# Patient Record
Sex: FEMALE | Race: WHITE | NOT HISPANIC OR LATINO | Employment: UNEMPLOYED | URBAN - METROPOLITAN AREA
[De-identification: names, ages, dates, MRNs, and addresses within clinical notes are randomized per-mention and may not be internally consistent; named-entity substitution may affect disease eponyms.]

---

## 2020-03-16 LAB
INFLUENZA A ANTIGEN, POC: NEGATIVE
INFLUENZA B ANTIGEN, POC: NEGATIVE

## 2022-04-10 ENCOUNTER — HISTORICAL (OUTPATIENT)
Dept: ADMINISTRATIVE | Facility: HOSPITAL | Age: 37
End: 2022-04-10

## 2022-04-26 VITALS
OXYGEN SATURATION: 100 % | HEIGHT: 67 IN | BODY MASS INDEX: 23.36 KG/M2 | DIASTOLIC BLOOD PRESSURE: 91 MMHG | SYSTOLIC BLOOD PRESSURE: 134 MMHG | WEIGHT: 148.81 LBS

## 2022-09-21 ENCOUNTER — HISTORICAL (OUTPATIENT)
Dept: ADMINISTRATIVE | Facility: HOSPITAL | Age: 37
End: 2022-09-21

## 2023-01-26 DIAGNOSIS — Z91.89 AT HIGH RISK FOR BREAST CANCER: Primary | ICD-10-CM

## 2023-02-03 ENCOUNTER — TELEPHONE (OUTPATIENT)
Dept: SURGERY | Facility: CLINIC | Age: 38
End: 2023-02-03
Payer: COMMERCIAL

## 2023-02-03 NOTE — TELEPHONE ENCOUNTER
Tried to contact patient in regards to gathering information on genetic history, but call was unsuccessful. Left voicemail to give office a call back. tcc

## 2023-02-06 ENCOUNTER — TELEPHONE (OUTPATIENT)
Dept: SURGERY | Facility: CLINIC | Age: 38
End: 2023-02-06
Payer: COMMERCIAL

## 2023-02-06 NOTE — TELEPHONE ENCOUNTER
Called and spoke w/ patient in regards to gathering more information on genetic testing history. Stated that she has not done genetic testing and have spoken w/ mother on subject 2years prior but was unable to gather more information with mother regarding genetic testing. Asked about imaging history; patient stated that she has tried to get mammo done (@BCA 2yrs prior) but was unable to get done, due to cost. Patient has High Risk appt on 2/7/23 @9AM. All questions have been answered. Advised patient to arrive 15min prior to appt time to check in. Patient verbalized understanding and confirmed appt. tcc

## 2023-02-07 ENCOUNTER — OFFICE VISIT (OUTPATIENT)
Dept: SURGERY | Facility: CLINIC | Age: 38
End: 2023-02-07
Payer: COMMERCIAL

## 2023-02-07 VITALS
HEIGHT: 66 IN | DIASTOLIC BLOOD PRESSURE: 70 MMHG | WEIGHT: 152.81 LBS | HEART RATE: 63 BPM | SYSTOLIC BLOOD PRESSURE: 106 MMHG | TEMPERATURE: 99 F | OXYGEN SATURATION: 100 % | RESPIRATION RATE: 18 BRPM | BODY MASS INDEX: 24.56 KG/M2

## 2023-02-07 DIAGNOSIS — Z80.3 FAMILY HISTORY OF BREAST CANCER: ICD-10-CM

## 2023-02-07 DIAGNOSIS — Z91.89 AT HIGH RISK FOR BREAST CANCER: Primary | ICD-10-CM

## 2023-02-07 PROCEDURE — 99999 PR PBB SHADOW E&M-EST. PATIENT-LVL IV: ICD-10-PCS | Mod: PBBFAC,,,

## 2023-02-07 PROCEDURE — 99999 PR PBB SHADOW E&M-EST. PATIENT-LVL IV: CPT | Mod: PBBFAC,,,

## 2023-02-07 PROCEDURE — 3008F PR BODY MASS INDEX (BMI) DOCUMENTED: ICD-10-PCS | Mod: CPTII,S$GLB,,

## 2023-02-07 PROCEDURE — 3078F DIAST BP <80 MM HG: CPT | Mod: CPTII,S$GLB,,

## 2023-02-07 PROCEDURE — 3008F BODY MASS INDEX DOCD: CPT | Mod: CPTII,S$GLB,,

## 2023-02-07 PROCEDURE — 3078F PR MOST RECENT DIASTOLIC BLOOD PRESSURE < 80 MM HG: ICD-10-PCS | Mod: CPTII,S$GLB,,

## 2023-02-07 PROCEDURE — 3074F PR MOST RECENT SYSTOLIC BLOOD PRESSURE < 130 MM HG: ICD-10-PCS | Mod: CPTII,S$GLB,,

## 2023-02-07 PROCEDURE — 1159F MED LIST DOCD IN RCRD: CPT | Mod: CPTII,S$GLB,,

## 2023-02-07 PROCEDURE — 99205 OFFICE O/P NEW HI 60 MIN: CPT | Mod: S$GLB,,,

## 2023-02-07 PROCEDURE — 1159F PR MEDICATION LIST DOCUMENTED IN MEDICAL RECORD: ICD-10-PCS | Mod: CPTII,S$GLB,,

## 2023-02-07 PROCEDURE — 3074F SYST BP LT 130 MM HG: CPT | Mod: CPTII,S$GLB,,

## 2023-02-07 PROCEDURE — 99205 PR OFFICE/OUTPT VISIT, NEW, LEVL V, 60-74 MIN: ICD-10-PCS | Mod: S$GLB,,,

## 2023-02-07 RX ORDER — TRANEXAMIC ACID 650 MG/1
TABLET ORAL
COMMUNITY
Start: 2023-02-01

## 2023-02-07 NOTE — PROGRESS NOTES
[facility] Breast [facility]  Breast Surgical Oncology  New Patient Office Visit - H&P      Referring Provider:  Dr. Kennedy Dixon - GYN      Chief Complaint:   Chief Complaint   Patient presents with    Genetic Evaluation     New patient presented for Genetic Visit. Family history of BC (mother); no genetic testing done; no breast pain, redness or nipple d/c to report.      Subjective:      History of Present Illness:  Charles Grover  is a pleasant female patient who initially presents on  02/07/2023 at 37 y.o. years old for evaluation and assessment of risk for breast cancer based on  the Tyrer - Cuzick Breast Cancer Risk Model (> 20% indicates elevated lifetime risk). Her lifetime risk is calculated to be 24%. The mastery of breast surgery program was down today in clinic, but I was able to use an outside calculater that used the same Tyrer Cuzick Score.     She currently denies any breast issues including rashes, redness, pain, swelling, nipple discharge, or new lumps/masses.    Imaging:   none    Pathology:   N/a     OB / GYN History   Menarche Onset:12   Menopause: Menopause: premenopausal  Hormonal birth control (duration): 6 years  Pregnancies: 3  Age at first child birth: 28  Child births: 2  Hysterectomy/Oophorectomy: no  HRT: no    Family History of Cancer (& age at diagnosis):  -   Family History   Problem Relation Age of Onset    Breast cancer Mother         late 50s    No Known Problems Father     Breast cancer Maternal Grandmother         early 50s    Other Maternal Grandfather         Blood disorder    Endocrine tumor Maternal Uncle     Pancreatic cancer Maternal Uncle       Maternal uncle endocrine tumor  and pancreatic cancer around 40 dianne     Lifestyle:  Height and Weight:   BMI: Body mass index is 24.66 kg/m².     Other:  # of breast biopsies (when and pathology results): 0  MG breast density: No breast composition recorded.   Prior thoracic RT: none  Genetic testing:  no  Ashkenazi Advent descent: No    Other History:     No past medical history on file.     Past Surgical History:   Procedure Laterality Date    ADENOIDECTOMY      TONSILLECTOMY          Social History     Socioeconomic History    Marital status:    Tobacco Use    Smoking status: Never    Smokeless tobacco: Never   Substance and Sexual Activity    Alcohol use: Not Currently    Drug use: Never    Sexual activity: Yes     Partners: Male          There is no immunization history on file for this patient.     Medications/Allergies:       Current Outpatient Medications:     tranexamic acid (LYSTEDA) 650 mg tablet, Take by mouth. Around menstrual cycles, Disp: , Rfl:     Review of patient's allergies indicates:  No Known Allergies     Review of Systems:      Constitutional: denies fevers, chills, weight loss  HEENT: denies blurry/double vision, changes in hearing, odynophagia, dysphagia  Respiratory: denies cough, shortness of breath  Cardiovascular: denies palpitations, swelling of the extremities  GI: denies abdominal pain, nausea/vomiting, hematochezia, frequent stools  : denies frequency, dysuria, flank pain, hematuria  Skin: denies new rashes  Neurological: denies muscular/sensory deficiencies, loss of coordination, headaches, memory changes  Endo: denies hair loss/thinning, nervousness, hot flashes, heat/cold intolerance, lumps in the neck area  Heme: denies easy bruising and fatigue  Psychological: denies anxious/depressive moods  Musculoskeletal: denies bony pain, muscle cramps, swollen joints       Objective/Physical Exam     Vitals:    02/07/23 0903   BP: 106/70   Pulse: 63   Resp: 18   Temp: 98.8 °F (37.1 °C)        General: The patient is awake, alert and oriented times three. The patient is well nourished and in no acute distress.  Neck: There is no evidence of palpable cervical, supraclavicular or axillary adenopathy. The neck is supple. The thyroid is not enlarged.  Musculoskeletal: The patient  has a normal range of motion of her bilateral upper extremities.  Chest: Examination of the chest wall fails to reveal any obvious abnormalities. Nonlabored breathing, symmetric expansion.  Breast:  Right: Examination of right breast fails to reveal any dominant masses or areas of significant focal nodularity. The nipple is everted without evidence of discharge. There is no skin dimpling with movement of the pectoralis. There are no significant skin changes overlying the breast.   Left: Examination of the left breast fails to reveal any dominant masses or areas of significant focal nodularity. The nipple is everted without evidence of discharge. There is no skin dimpling with movement of the pectoralis. There are no significant skin changes overlying the breast.  Abdomen: The abdomen is soft, flat, nontender and nondistended.  Integumentary: no rashes or skin lesions present  Neurologic: cranial nerves intact, no signs of peripheral neurological deficit, motor/sensory function intact     Assessment and Plan     There is no problem list on file for this patient.      Charles was seen today for genetic evaluation.    Diagnoses and all orders for this visit:    At high risk for breast cancer  -     MRI Breast w/wo Contrast, w/CAD, Bilateral; Future  -     Mammo Digital Screening Bilat w/ [facility]; Future    Family history of breast cancer  -     MRI Breast w/wo Contrast, w/CAD, Bilateral; Future  -     Mammo Digital Screening Bilat w/ [facility]; Future           --------------------------------------------------------------------------------------------------------------  After the initial clinical evaluation nearly 30 minutes were on counseling the patients regarding the options for management. Risk reduction strategies were discussed.     1. Lifestyle factors: As with other types of cancer, studies continue to show that various lifestyle factors may contribute to the development of breast cancer.     Weight: Recent studies have  "shown that postmenopausal women who are overweight or obese have an increased risk of breast cancer. These women also have a higher risk of having the cancer come back after treatment.     Physical activity: Decreased physical activity is associated with an increased risk of developing breast cancer and a higher risk of having the cancer come back after treatment. Regular physical activity may protect against breast cancer by helping women maintain a healthy body weight, lowering hormone levels, or causing changes in a womens metabolism or immune factors.     Alcohol: Current research suggests that having more than 1 to 2 alcoholic drinks, including beer, wine, and spirits, per day raises the risk of breast cancer, as well as the risk of having the cancer come back after treatment.     Food: There is no reliable research that confirms that eating or avoiding specific foods reduces the risk of developing breast cancer or having the cancer come back after treatment. However, eating more fruits and vegetables and fewer animal fats is linked with many health benefits.     2. Prevention:  Surgery to lower cancer risk: For women with BRCA1 or BRCA2 genetic mutations, which substantially increase the risk of breast cancer, preventive removal of the breasts may be considered. The procedure, called a prophylactic mastectomy, appears to reduce the risk of developing breast cancer by at least 95%. Women with these mutations should also consider the preventive removal of the ovaries and fallopian tubes, called a prophylactic salpingo-oophorectomy. This procedure can reduce the risk of developing ovarian cancer, as well as breast cancer, by stopping the ovaries from making estrogen.      Drugs to lower cancer risk (Chemoprevention): Women who have a higher than usual risk of developing breast cancer may consider certain drugs that may help prevent breast cancer. This approach may also be called "chemoprevention." For breast " cancer, this is the use of hormone-blocking drugs to reduce cancer risk. The drugs, tamoxifen (Soltamox) and raloxifene (Evista), are approved by the U.S. Food and Drug Administration (FDA) to lower breast cancer risk. These drugs are called selective estrogen receptor modulators (SERMs) and are not chemotherapy. A SERM is a medication that blocks estrogen receptors in some tissues and not others. Both women who have gone through menopause and those who have not may take tamoxifen. Raloxifene is only approved for women who have gone through menopause. Each drug also has different side effects.     Aromatase inhibitors (AIs) have also been shown to lower breast cancer risk. AIs are a type of hormone-blocking treatment that reduces the amount of estrogen in a woman's body by stopping tissues and organs other than the ovaries from producing estrogen. They can only be used by women who have gone through menopause. However, no AIs have been approved by the FDA for lowering breast cancer risk in women who do not have the disease.     3. Surveillance: Women at greater than 20 percent average lifetime risk of invasive breast cancer based mainly on family history     Clinical Breast exam: Every 6-12 months starting at age found to be at increased risk by risk model     Mammogram: Every year starting 10 years younger than the youngest breast cancer case in the family (but not before age 30)     Breast MRI: Every year starting 10 years younger than the youngest breast cancer case in the family (but not before age 25). If you have a first-degree relative with a BRCA1/2 gene mutation, youre encouraged to get genetic counseling and/or testing before getting MRI as part of screening (for those who do not wish to have genetic testing, MRI is recommended). Breast MRI in combination with mammography is better than mammography alone at finding breast cancer in certain women at higher than average  risk.    --------------------------------------------------------------------------------------------------------------      PLAN:    1. Lifestyle - Healthy lifestyle guidelines were reviewed. She was encouraged to engage in regular exercise, maintain a healthy body weight, and avoid excessive alcohol consumption. Healthy nutritional guidelines were also discussed. Self-breast examination was reviewed with the patient in detail and she was encouraged to perform this on a monthly basis.    2. Surveillance - She desires undergoing high risk screening with annual screening mammograms and breast MRIs. In the absence of significant clinical findings in the interval, I recommend a SCR MG at next available. A high risk screening MRI in August. RTC in July.     3. Prevention - We had a brief discussion/education about indications for preventative mastectomy or chemoprevention.  These methods are not recommended to her at this time.    4. Genetics - According to NCCN guidelines, she meets criteria for genetic testing but does not wish to undergo it at this present time. Will follow up on this at next visit.     5. Continue to see OB/GYN for CBE. Recommend two CBE a year. One with us and one with your OB/GYN Provider. We recommend them to be at a six month interval.  She sees Dr. Dixon in the winter. Will follow up with her in July and then see her annually unless  otherwise indicated.     6. RTC in July.     7. Please call our office with any questions or concerns that may arise before the next appointment.     All of her questions were answered.     PRABHAKAR Stephens      --------------------------------------------------------------------------------------------------------------  --------------------------------------------------------------------------------------------------------------  Total time on the date of the visit ranged from 60-74 mins (42446). Total time includes both face-to-face and  non-face-to-face time personally spent by myself on the day of the visit.    Non-face-to-face time included:  _X_ preparing to see the patient such as reviewing the patient record  _X_ obtaining and reviewing separately obtained history  _X_ independently interpreting results  _X_ documenting clinical information in electronic health record.    Face-to-face time included:  _X_ performing an appropriate history and examination  _X_ communicating results to the patient  _X_ counseling and educating the patient  __ ordering appropriate medications  _x_ ordering appropriate tests  _X_ ordering appropriate procedures (including follow-up)  _X_ answering any questions the patient had    Total Time spent on date of visit: 60 minutes

## 2023-03-23 ENCOUNTER — TELEPHONE (OUTPATIENT)
Dept: SURGERY | Facility: CLINIC | Age: 38
End: 2023-03-23
Payer: COMMERCIAL

## 2023-11-06 NOTE — PROGRESS NOTES
[facility]  Breast Surgical Oncology  New Patient Office Visit - H&P      Referring Provider:  No ref. provider found - GYN      Chief Complaint:   Chief Complaint   Patient presents with    Follow-up     CBE Followup -patient reports no breast concerns      Subjective:      Interval: 2023 Patient presents for high risk follow up appointment.  Patient is doing well today. She currently denies any breast issues including rashes, redness, pain, swelling, nipple discharge, or new lumps/masses.  Patient had breast augmentation with Dr. Harmon over the summer, and she states she has healed well.  Due to the surgery, she was not able undergo breast MRI when scheduled. She was also not able to come to her high-risk appointment that was scheduled for July.  Patient states she would like to get back on schedule with screening and appointments now that her surgery is behind her.  She still does her self-breast exams regularly.  She has her menstrual cycles regularly.  States she lives a healthy lifestyle.  She does not smoke, and she drinks alcohol in moderation.      History of Present Illness:  Charles Grover  is a pleasant female patient who initially presented at 37 years old for evaluation of risk for breast cancer based on  the Tyrer - Cuzick Breast Cancer Risk Model (> 20% indicates elevated lifetime risk). Her lifetime risk is calculated to be 36.9% (updated 23).    Imagin2023 BL SC MG:  There is no mammographic evidence of malignancy. BI-RADS: 1 Negative.     Pathology:   N/a     OB / GYN History   Menarche Onset:12   Menopause: Menopause: premenopausal  Hormonal birth control (duration): 6 years  Pregnancies: 3  Age at first child birth: 28  Child births: 2  Hysterectomy/Oophorectomy: no  HRT: no    Family History of Cancer (& age at diagnosis):  -   Family History   Problem Relation Age of Onset    Breast cancer Mother         late 50s    No Known  Problems Father     Breast cancer Maternal Grandmother         early 50s    Other Maternal Grandfather         Blood disorder    Endocrine tumor Maternal Uncle     Pancreatic cancer Maternal Uncle       Maternal uncle endocrine tumor  and pancreatic cancer around 40 dianne     Lifestyle:  Height and Weight:   BMI: Body mass index is 24.44 kg/m².     Other:  # of breast biopsies (when and pathology results): 0  MG breast density: BIRADS C  Prior thoracic RT: none  Genetic testing: no  Ashkenazi Shinto descent: No    Other History:   History reviewed. No pertinent past medical history.     Past Surgical History:   Procedure Laterality Date    ADENOIDECTOMY      COSMETIC SURGERY  8/17/23    TONSILLECTOMY          Social History     Socioeconomic History    Marital status:    Tobacco Use    Smoking status: Never    Smokeless tobacco: Never   Substance and Sexual Activity    Alcohol use: Not Currently    Drug use: Never    Sexual activity: Yes     Partners: Male     Birth control/protection: Partner-Vasectomy          There is no immunization history on file for this patient.     Medications/Allergies:       Current Outpatient Medications:     tranexamic acid (LYSTEDA) 650 mg tablet, Take by mouth. Around menstrual cycles, Disp: , Rfl:     Review of patient's allergies indicates:  No Known Allergies     Review of Systems:      All pertinent history mentioned in HPI.     Objective/Physical Exam     Vitals:    11/07/23 1109   BP: 107/70   Pulse: 69   Resp: 18   Temp: 98.7 °F (37.1 °C)        General: The patient is awake, alert and oriented times three. The patient is well nourished and in no acute distress.  Neck: There is no evidence of palpable cervical, supraclavicular or axillary adenopathy. The neck is supple. The thyroid is not enlarged.  Musculoskeletal: The patient has a normal range of motion of her bilateral upper extremities.  Chest: Examination of the chest wall fails to reveal any obvious abnormalities.  Nonlabored breathing, symmetric expansion.  Breast:  Right:  Well-healed breast augmentation scar along  inframammary fold. Examination of right breast fails to reveal any dominant masses or areas of significant focal nodularity. The nipple is everted without evidence of discharge. There is no skin dimpling with movement of the pectoralis. There are no significant skin changes overlying the breast.   Left: Well-healed breast augmentation scar along inframammary fold.  Examination of the left breast fails to reveal any dominant masses or areas of significant focal nodularity. The nipple is everted without evidence of discharge. There is no skin dimpling with movement of the pectoralis. There are no significant skin changes overlying the breast.  Abdomen: The abdomen is soft, flat, nontender and nondistended.  Integumentary: no rashes or skin lesions present  Neurologic: cranial nerves intact, no signs of peripheral neurological deficit, motor/sensory function intact     Assessment and Plan     There is no problem list on file for this patient.      Charles was seen today for follow-up.    Diagnoses and all orders for this visit:    At high risk for breast cancer  -     Mammo Digital Screening Bilat w/ [facility]; Future  -     MRI Breast w/wo Contrast, w/CAD, Bilateral; Future    Family history of breast cancer  -     Mammo Digital Screening Bilat w/ [facility]; Future  -     MRI Breast w/wo Contrast, w/CAD, Bilateral; Future    Screening mammogram for breast cancer  -     Mammo Digital Screening Bilat w/ [facility]; Future        --------------------------------------------------------------------------------------------------------------    PLAN:    1. Lifestyle - Healthy lifestyle guidelines were reviewed. She was encouraged to engage in regular exercise, maintain a healthy body weight, and avoid excessive alcohol consumption. Healthy nutritional guidelines were also discussed. Self-breast examination was reviewed with the patient in  detail and she was encouraged to perform this on a monthly basis.    2. Surveillance - She desires undergoing high risk screening with annual screening mammograms and breast MRIs. In the absence of significant clinical findings in the interval, I recommend a SCR MG in Feb 2024 and Breast MRI in August 2024. RTC after MRI in August 2024.     3. Prevention - We had a brief discussion/education about indications for preventative mastectomy or chemoprevention.  These methods are not recommended to her at this time.    4. Genetics - According to NCCN guidelines, she meets criteria for genetic testing but does not wish to undergo it at this present time.     5. Continue to see OB/GYN for CBE. Recommend two CBE a year. One with us and one with your OB/GYN Provider. We recommend them to be at a six month interval.  She sees Dr. Dixon in January 2024. Will follow up with patient in August 2024 after her MRI to get on CBE q.6 months schedule.    All of her questions were answered. Please call our office with any questions or concerns that may arise before the next appointment.     Jaymie Arita PA-C    --------------------------------------------------------------------------------------------------------------  --------------------------------------------------------------------------------------------------------------  Total time on the date of the visit ranged from 20-29 mins ([id number]). Total time includes both face-to-face and non-face-to-face time personally spent by myself on the day of the visit.     Non-face-to-face time included:  _X_ preparing to see the patient such as reviewing the patient record  __ obtaining and reviewing separately obtained history  _X_ independently interpreting results  _X_ documenting clinical information in electronic health record.     Face-to-face time included:  _X_ performing an appropriate history and examination  _X_ communicating results to the patient  _X_ counseling and educating the  patient  __ ordering appropriate medications  __ ordering appropriate tests  _X_ ordering appropriate procedures (including follow-up)  _X_ answering any questions the patient had     Total Time spent on date of visit: 25 minutes

## 2023-11-07 ENCOUNTER — OFFICE VISIT (OUTPATIENT)
Dept: SURGERY | Facility: CLINIC | Age: 38
End: 2023-11-07
Payer: COMMERCIAL

## 2023-11-07 VITALS
BODY MASS INDEX: 24.33 KG/M2 | DIASTOLIC BLOOD PRESSURE: 70 MMHG | OXYGEN SATURATION: 99 % | HEIGHT: 66 IN | HEART RATE: 69 BPM | WEIGHT: 151.38 LBS | RESPIRATION RATE: 18 BRPM | TEMPERATURE: 99 F | SYSTOLIC BLOOD PRESSURE: 107 MMHG

## 2023-11-07 DIAGNOSIS — Z91.89 AT HIGH RISK FOR BREAST CANCER: Primary | ICD-10-CM

## 2023-11-07 DIAGNOSIS — Z12.31 SCREENING MAMMOGRAM FOR BREAST CANCER: ICD-10-CM

## 2023-11-07 DIAGNOSIS — Z80.3 FAMILY HISTORY OF BREAST CANCER: ICD-10-CM

## 2023-11-07 PROCEDURE — 99999 PR PBB SHADOW E&M-EST. PATIENT-LVL IV: ICD-10-PCS | Mod: PBBFAC,,,

## 2023-11-07 PROCEDURE — 3008F BODY MASS INDEX DOCD: CPT | Mod: CPTII,S$GLB,,

## 2023-11-07 PROCEDURE — 3008F PR BODY MASS INDEX (BMI) DOCUMENTED: ICD-10-PCS | Mod: CPTII,S$GLB,,

## 2023-11-07 PROCEDURE — 1160F RVW MEDS BY RX/DR IN RCRD: CPT | Mod: CPTII,S$GLB,,

## 2023-11-07 PROCEDURE — 1160F PR REVIEW ALL MEDS BY PRESCRIBER/CLIN PHARMACIST DOCUMENTED: ICD-10-PCS | Mod: CPTII,S$GLB,,

## 2023-11-07 PROCEDURE — 99213 OFFICE O/P EST LOW 20 MIN: CPT | Mod: S$GLB,,,

## 2023-11-07 PROCEDURE — 99213 PR OFFICE/OUTPT VISIT, EST, LEVL III, 20-29 MIN: ICD-10-PCS | Mod: S$GLB,,,

## 2023-11-07 PROCEDURE — 3078F PR MOST RECENT DIASTOLIC BLOOD PRESSURE < 80 MM HG: ICD-10-PCS | Mod: CPTII,S$GLB,,

## 2023-11-07 PROCEDURE — 99999 PR PBB SHADOW E&M-EST. PATIENT-LVL IV: CPT | Mod: PBBFAC,,,

## 2023-11-07 PROCEDURE — 3074F SYST BP LT 130 MM HG: CPT | Mod: CPTII,S$GLB,,

## 2023-11-07 PROCEDURE — 3074F PR MOST RECENT SYSTOLIC BLOOD PRESSURE < 130 MM HG: ICD-10-PCS | Mod: CPTII,S$GLB,,

## 2023-11-07 PROCEDURE — 3078F DIAST BP <80 MM HG: CPT | Mod: CPTII,S$GLB,,

## 2023-11-07 PROCEDURE — 1159F MED LIST DOCD IN RCRD: CPT | Mod: CPTII,S$GLB,,

## 2023-11-07 PROCEDURE — 1159F PR MEDICATION LIST DOCUMENTED IN MEDICAL RECORD: ICD-10-PCS | Mod: CPTII,S$GLB,,

## 2024-09-30 NOTE — PROGRESS NOTES
[facility]  Breast Surgical Oncology  Est Patient Office Visit - H&P      Referring Provider:  No ref. provider found - GYN      Chief Complaint:   Chief Complaint   Patient presents with    Follow-up     Patient reports no breast related concerns      Subjective:      Interval: 10/01/2024 Patient presents for annual high risk follow up appointment. Patient is doing well today. She currently denies any breast issues including rashes, redness, pain, swelling, nipple discharge, or new lumps/masses.  She had a screening mammogram in February which resulted as benign.  She was not able to undergo breast MRI in the interval due to issues scheduling.  She still does her self-breast exams regularly. She has her menstrual cycles regularly.  She states she is still trying to live a healthy lifestyle.  She denies any significant interval changes or any changes in family history.  She has no other questions or concerns today.    History of Present Illness:  Charles Grover  is a pleasant female patient who initially presented at 37 years old for evaluation of risk for breast cancer based on  the Tyrer - Cuzick Breast Cancer Risk Model (> 20% indicates elevated lifetime risk). Her lifetime risk is calculated to be 36.9% (updated 23).    Imagin2023 BL SC MG:  There is no mammographic evidence of malignancy. BI-RADS: 1 Negative.   2024 SC MG: There is no mammographic evidence of malignancy. BI-RADS: 2 Benign     Pathology:   N/a     OB / GYN History   Menarche Onset:12   Menopause: Menopause: premenopausal  Hormonal birth control (duration): 6 years  Pregnancies: 3  Age at first child birth: 28  Child births: 2  Hysterectomy/Oophorectomy: no  HRT: no    Family History of Cancer (& age at diagnosis):  -   Family History   Problem Relation Name Age of Onset    Breast cancer Mother Leann Adhikari         late 50s    No Known Problems Father      Breast cancer Maternal  Grandmother Emily Altman         early 50s    Endocrine tumor Maternal Uncle      Pancreatic cancer Maternal Uncle        Maternal uncle endocrine tumor  and pancreatic cancer around 40 dianne     Lifestyle:  Height and Weight:   BMI: Body mass index is 22.92 kg/m².     Other:  # of breast biopsies (when and pathology results): 0  MG breast density: BIRADS C  Prior thoracic RT: none  Genetic testing: no  Ashkenazi Yazidi descent: No    Other History:   History reviewed. No pertinent past medical history.     Past Surgical History:   Procedure Laterality Date    ABDOMINAL SURGERY  08/23    ADENOIDECTOMY      COSMETIC SURGERY  8/17/23    TONSILLECTOMY          Social History     Socioeconomic History    Marital status:    Tobacco Use    Smoking status: Never    Smokeless tobacco: Never   Substance and Sexual Activity    Alcohol use: Not Currently    Drug use: Never    Sexual activity: Yes     Partners: Male     Birth control/protection: Partner-Vasectomy          There is no immunization history on file for this patient.     Medications/Allergies:       Current Outpatient Medications:     tranexamic acid (LYSTEDA) 650 mg tablet, Take by mouth. Around menstrual cycles, Disp: , Rfl:     Review of patient's allergies indicates:  No Known Allergies     Review of Systems:      All pertinent history mentioned in HPI.     Objective/Physical Exam     Vitals:    10/01/24 0906   BP: 107/71   Pulse: 63   Resp: 18   Temp: 98.4 °F (36.9 °C)          General: The patient is awake, alert and oriented times three. The patient is well nourished and in no acute distress.  Neck: There is no evidence of palpable cervical, supraclavicular or axillary adenopathy. The neck is supple. The thyroid is not enlarged.  Musculoskeletal: The patient has a normal range of motion of her bilateral upper extremities.  Chest: Examination of the chest wall fails to reveal any obvious abnormalities. Nonlabored breathing, symmetric  expansion.  Breast:  Right:  Well-healed wise pattern scar.  Examination of right breast fails to reveal any dominant masses or areas of significant focal nodularity. The nipple is everted without evidence of discharge. There is no skin dimpling with movement of the pectoralis. There are no significant skin changes overlying the breast.   Left:  Well-healed wise pattern scar.  Examination of the left breast fails to reveal any dominant masses or areas of significant focal nodularity. The nipple is everted without evidence of discharge. There is no skin dimpling with movement of the pectoralis. There are no significant skin changes overlying the breast.  Abdomen: The abdomen is soft, flat, nontender and nondistended.  Integumentary: no rashes or skin lesions present  Neurologic: cranial nerves intact, no signs of peripheral neurological deficit, motor/sensory function intact     Assessment and Plan     There is no problem list on file for this patient.      Charles was seen today for follow-up.    Diagnoses and all orders for this visit:    At high risk for breast cancer  -     MRI Screening Breast W/WO Contrast, W/CAD, Dev; Future  -     Mammo Digital Screening Bilat w/ [facility]; Future    Screening mammogram for breast cancer  -     Mammo Digital Screening Bilat w/ [facility]; Future    Family history of breast cancer  -     MRI Screening Breast W/WO Contrast, W/CAD, Dev; Future  -     Mammo Digital Screening Bilat w/ [facility]; Future        --------------------------------------------------------------------------------------------------------------    PLAN:    1. Lifestyle - Healthy lifestyle guidelines were reviewed. She was encouraged to engage in regular exercise, maintain a healthy body weight, and avoid excessive alcohol consumption. Healthy nutritional guidelines were also discussed. Self-breast examination was reviewed with the patient in detail and she was encouraged to perform this on a monthly basis.    2. Surveillance  - She desires undergoing high risk screening with annual screening mammograms and breast MRIs. In the absence of significant clinical findings in the interval, I recommend Breast MRI next available and screening mammogram in February 2025.  RTC in July 2025 to get back on track with clinical breast exam every 6 months schedule between myself and her gynecologist.    3. Prevention - We had a brief discussion/education about indications for preventative mastectomy or chemoprevention.  These methods are not recommended to her at this time.    4. Genetics - According to NCCN guidelines, she meets criteria for genetic testing but does not wish to undergo it at this present time.     5. Continue to see OB/GYN for CBE. Recommend two CBE a year. One with us and one with your OB/GYN Provider. We recommend them to be at a six month interval.     All of her questions were answered. Please call our office with any questions or concerns that may arise before the next appointment.     Jaymie Jackson PA-C     --------------------------------------------------------------------------------------------------------------  --------------------------------------------------------------------------------------------------------------    Total time on the date of the visit ranged from 30-39 mins (79385). Total time includes both face-to-face and non-face-to-face time personally spent by myself on the day of the visit.    Non-face-to-face time included:  _X_ preparing to see the patient such as reviewing the patient record  __ obtaining and reviewing separately obtained history  _X_ independently interpreting results  _X_ documenting clinical information in electronic health record.    Face-to-face time included:  _X_ performing an appropriate history and examination  _X_ communicating results to the patient  _X_ counseling and educating the patient  __ ordering appropriate medications  __ ordering appropriate tests  _X_ ordering  appropriate procedures (including follow-up)  _X_ answering any questions the patient had    Total Time spent on date of visit: 35 minutes

## 2024-10-01 ENCOUNTER — OFFICE VISIT (OUTPATIENT)
Dept: SURGERY | Facility: CLINIC | Age: 39
End: 2024-10-01
Payer: COMMERCIAL

## 2024-10-01 VITALS
HEIGHT: 66 IN | SYSTOLIC BLOOD PRESSURE: 107 MMHG | TEMPERATURE: 98 F | OXYGEN SATURATION: 100 % | HEART RATE: 63 BPM | WEIGHT: 142 LBS | DIASTOLIC BLOOD PRESSURE: 71 MMHG | RESPIRATION RATE: 18 BRPM | BODY MASS INDEX: 22.82 KG/M2

## 2024-10-01 DIAGNOSIS — Z12.31 SCREENING MAMMOGRAM FOR BREAST CANCER: ICD-10-CM

## 2024-10-01 DIAGNOSIS — Z80.3 FAMILY HISTORY OF BREAST CANCER: ICD-10-CM

## 2024-10-01 DIAGNOSIS — Z91.89 AT HIGH RISK FOR BREAST CANCER: Primary | ICD-10-CM

## 2024-10-01 PROCEDURE — 3008F BODY MASS INDEX DOCD: CPT | Mod: CPTII,S$GLB,,

## 2024-10-01 PROCEDURE — 3078F DIAST BP <80 MM HG: CPT | Mod: CPTII,S$GLB,,

## 2024-10-01 PROCEDURE — 3074F SYST BP LT 130 MM HG: CPT | Mod: CPTII,S$GLB,,

## 2024-10-01 PROCEDURE — 1159F MED LIST DOCD IN RCRD: CPT | Mod: CPTII,S$GLB,,

## 2024-10-01 PROCEDURE — 99214 OFFICE O/P EST MOD 30 MIN: CPT | Mod: S$GLB,,,

## 2024-10-01 PROCEDURE — 99999 PR PBB SHADOW E&M-EST. PATIENT-LVL IV: CPT | Mod: PBBFAC,,,

## 2024-10-01 PROCEDURE — 1160F RVW MEDS BY RX/DR IN RCRD: CPT | Mod: CPTII,S$GLB,,

## 2025-02-24 ENCOUNTER — HOSPITAL ENCOUNTER (OUTPATIENT)
Dept: RADIOLOGY | Facility: HOSPITAL | Age: 40
Discharge: HOME | End: 2025-02-24
Payer: COMMERCIAL

## 2025-02-24 DIAGNOSIS — Z80.3 FAMILY HISTORY OF BREAST CANCER: ICD-10-CM

## 2025-02-24 DIAGNOSIS — Z12.31 SCREENING MAMMOGRAM FOR BREAST CANCER: ICD-10-CM

## 2025-02-24 DIAGNOSIS — Z91.89 AT HIGH RISK FOR BREAST CANCER: ICD-10-CM

## 2025-02-24 PROCEDURE — 77063 BREAST TOMOSYNTHESIS BI: CPT | Mod: TC

## 2025-02-24 PROCEDURE — 77067 SCR MAMMO BI INCL CAD: CPT | Mod: 26,,, | Performed by: STUDENT IN AN ORGANIZED HEALTH CARE EDUCATION/TRAINING PROGRAM

## 2025-02-24 PROCEDURE — 77063 BREAST TOMOSYNTHESIS BI: CPT | Mod: 26,,, | Performed by: STUDENT IN AN ORGANIZED HEALTH CARE EDUCATION/TRAINING PROGRAM

## 2025-02-26 ENCOUNTER — RESULTS FOLLOW-UP (OUTPATIENT)
Dept: SURGERY | Facility: CLINIC | Age: 40
End: 2025-02-26

## 2025-07-31 NOTE — PROGRESS NOTES
[facility] Breast Surg  Breast Surgical Oncology  Est Patient Office Visit - H&P      Referring Provider:  No ref. provider found - GYN      Chief Complaint:   Chief Complaint   Patient presents with    Follow-up     Patient denies any breast related concerns on today      Subjective:      Interval: 2025 Patient presents for annual high risk follow up appointment. Patient is doing well today. She currently denies any breast issues including rashes, redness, pain, swelling, nipple discharge, or new lumps/masses.  She had a screening mammogram in February which resulted as benign.  She is scheduled to undergo breast MRI in September of this year.  She still does her self-breast exams regularly. She has her menstrual cycles regularly.  She states she is still trying to live a healthy lifestyle.  Patient's mother had recurrence of her breast cancer this past year.  Patient states her mother's breast cancer recurred in the same breast and she recently underwent mastectomy with radiation for treatment.  She states after further workup, her mother's breast cancer was found to be in her bone as well.  So, her mother is following with Dr. Leavitt for treatment.  Patient's mother was genetically tested and is negative.  Otherwise, she denies any significant interval changes or any other changes in family history.  She has no other questions or concerns today.    History of Present Illness:  Charles Grover  is a pleasant female patient who initially presented at 37 years old for evaluation of risk for breast cancer based on  the Tyrer - Cuzick Breast Cancer Risk Model (> 20% indicates elevated lifetime risk). Her lifetime risk is calculated to be 24.3% (updated 25).    Imagin2023 BL SC MG:  There is no mammographic evidence of malignancy. BI-RADS: 1 Negative.   2024 SC MG: There is no mammographic evidence of malignancy. BI-RADS: 2 Benign   2025 SC MG: There is no  mammographic evidence of malignancy. BI-RADS: 2 Benign     Pathology:   N/a     OB / GYN History   Menarche Onset:12   Menopause: Menopause: premenopausal  Hormonal birth control (duration): 6 years  Pregnancies: 3  Age at first child birth: 28  Child births: 2  Hysterectomy/Oophorectomy: no  HRT: no    Family History of Cancer (& age at diagnosis):  -   Family History   Problem Relation Name Age of Onset    Breast cancer Mother Leann Adhikari         1st Dx: late 50s; 2nd Dx: 74yo    No Known Problems Father      Breast cancer Maternal Grandmother Emily Altmna         early 50s    Endocrine tumor Maternal Uncle      Pancreatic cancer Maternal Uncle        Maternal uncle endocrine tumor  and pancreatic cancer around 40 dianne     Lifestyle:  Height and Weight:   BMI: Body mass index is 21.6 kg/m².     Other:  # of breast biopsies (when and pathology results): 0  MG breast density: BIRADS C  Prior thoracic RT: none  Genetic testing: no  Ashkenazi Bahai descent: No    Other History:   History reviewed. No pertinent past medical history.     Past Surgical History:   Procedure Laterality Date    ABDOMINAL SURGERY  08/23    ADENOIDECTOMY      COSMETIC SURGERY  8/17/23    TONSILLECTOMY          Social History     Socioeconomic History    Marital status:    Tobacco Use    Smoking status: Never    Smokeless tobacco: Never   Substance and Sexual Activity    Alcohol use: Not Currently    Drug use: Never    Sexual activity: Yes     Partners: Male     Birth control/protection: Partner-Vasectomy          There is no immunization history on file for this patient.     Medications/Allergies:       Current Outpatient Medications:     ibuprofen (ADVIL,MOTRIN) 800 MG tablet, Take 800 mg by mouth every 8 (eight) hours as needed., Disp: , Rfl:     tranexamic acid (LYSTEDA) 650 mg tablet, Take by mouth. Around menstrual cycles (Patient taking differently: Take by mouth. Around menstrual cycles(PRN)), Disp: , Rfl:     Review of  patient's allergies indicates:  No Known Allergies     Review of Systems:      All pertinent history mentioned in HPI.     Objective/Physical Exam     Vitals:    08/04/25 1513   BP: 96/62   Pulse:    Resp:    Temp:             General: The patient is awake, alert and oriented times three. The patient is well nourished and in no acute distress.  Neck: There is no evidence of palpable cervical, supraclavicular or axillary adenopathy. The neck is supple. The thyroid is not enlarged.  Musculoskeletal: The patient has a normal range of motion of her bilateral upper extremities.  Chest: Examination of the chest wall fails to reveal any obvious abnormalities. Nonlabored breathing, symmetric expansion.  Breast:  Right:  Well-healed wise pattern scar.  Examination of right breast fails to reveal any dominant masses or areas of significant focal nodularity. The nipple is everted without evidence of discharge. There is no skin dimpling with movement of the pectoralis. There are no significant skin changes overlying the breast.   Left:   Well-healed wise pattern scar.  Examination of the left breast fails to reveal any dominant masses or areas of significant focal nodularity. The nipple is everted without evidence of discharge. There is no skin dimpling with movement of the pectoralis. There are no significant skin changes overlying the breast.  Abdomen: The abdomen is soft, flat, nontender and nondistended.  Integumentary: no rashes or skin lesions present  Neurologic: cranial nerves intact, no signs of peripheral neurological deficit, motor/sensory function intact     Assessment and Plan     There is no problem list on file for this patient.      Charles was seen today for follow-up.    Diagnoses and all orders for this visit:    At high risk for breast cancer  -     Mammo Digital Screening Bilat w/ [facility] (XPD); Future    Family history of breast cancer  -     Mammo Digital Screening Bilat w/ [facility] (XPD); Future    Screening  mammogram for breast cancer  -     Mammo Digital Screening Bilat w/ [facility] (XPD); Future        --------------------------------------------------------------------------------------------------------------    PLAN:    1. Lifestyle - Healthy lifestyle guidelines were reviewed. She was encouraged to engage in regular exercise, maintain a healthy body weight, and avoid excessive alcohol consumption. Healthy nutritional guidelines were also discussed. Self-breast examination was reviewed with the patient in detail and she was encouraged to perform this on a monthly basis.    2. Surveillance - She desires undergoing high risk screening with annual screening mammograms and breast MRIs. In the absence of significant clinical findings in the interval, I recommend patient undergo Breast MRI as scheduled in September and next screening mammogram in February 2026.  RTC in 1 year.    3. Prevention - We had a brief discussion/education about indications for preventative mastectomy or chemoprevention.  These methods are not recommended to her at this time.    4. Genetics - Patient's mother was genetically tested her genetics came back as negative.    5. Continue to see OB/GYN for CBE. Recommend two CBE a year. One with us and one with your OB/GYN Provider. We recommend them to be at a six month interval.     All of her questions were answered. Please call our office with any questions or concerns that may arise before the next appointment.     Jaymie Jackson PA-C     --------------------------------------------------------------------------------------------------------------    Total time on the date of the visit ranged from 30-39 mins ([id number]). Total time includes both face-to-face and non-face-to-face time personally spent by myself on the day of the visit.    Non-face-to-face time included:  _X_ preparing to see the patient such as reviewing the patient record  __ obtaining and reviewing separately obtained history  _X_  independently interpreting results  _X_ documenting clinical information in electronic health record.    Face-to-face time included:  _X_ performing an appropriate history and examination  _X_ communicating results to the patient  _X_ counseling and educating the patient  __ ordering appropriate medications  __ ordering appropriate tests  _X_ ordering appropriate procedures (including follow-up)  _X_ answering any questions the patient had    Total Time spent on date of visit: 35 minutes

## 2025-08-04 ENCOUNTER — OFFICE VISIT (OUTPATIENT)
Dept: SURGERY | Facility: CLINIC | Age: 40
End: 2025-08-04
Payer: COMMERCIAL

## 2025-08-04 VITALS
TEMPERATURE: 98 F | SYSTOLIC BLOOD PRESSURE: 96 MMHG | DIASTOLIC BLOOD PRESSURE: 62 MMHG | RESPIRATION RATE: 18 BRPM | HEART RATE: 70 BPM | WEIGHT: 133.81 LBS | HEIGHT: 66 IN | OXYGEN SATURATION: 98 % | BODY MASS INDEX: 21.51 KG/M2

## 2025-08-04 DIAGNOSIS — Z80.3 FAMILY HISTORY OF BREAST CANCER: ICD-10-CM

## 2025-08-04 DIAGNOSIS — Z12.31 SCREENING MAMMOGRAM FOR BREAST CANCER: ICD-10-CM

## 2025-08-04 DIAGNOSIS — Z91.89 AT HIGH RISK FOR BREAST CANCER: Primary | ICD-10-CM

## 2025-08-04 PROCEDURE — 3078F DIAST BP <80 MM HG: CPT | Mod: CPTII,S$GLB,,

## 2025-08-04 PROCEDURE — 3008F BODY MASS INDEX DOCD: CPT | Mod: CPTII,S$GLB,,

## 2025-08-04 PROCEDURE — 99214 OFFICE O/P EST MOD 30 MIN: CPT | Mod: S$GLB,,,

## 2025-08-04 PROCEDURE — 3074F SYST BP LT 130 MM HG: CPT | Mod: CPTII,S$GLB,,

## 2025-08-04 PROCEDURE — 1159F MED LIST DOCD IN RCRD: CPT | Mod: CPTII,S$GLB,,

## 2025-08-04 PROCEDURE — 99999 PR PBB SHADOW E&M-EST. PATIENT-LVL IV: CPT | Mod: PBBFAC,,,

## 2025-08-04 RX ORDER — IBUPROFEN 800 MG/1
800 TABLET, FILM COATED ORAL EVERY 8 HOURS PRN
COMMUNITY
Start: 2025-07-08